# Patient Record
Sex: FEMALE | Race: WHITE | NOT HISPANIC OR LATINO | Employment: FULL TIME | RURAL
[De-identification: names, ages, dates, MRNs, and addresses within clinical notes are randomized per-mention and may not be internally consistent; named-entity substitution may affect disease eponyms.]

---

## 2023-12-05 ENCOUNTER — OFFICE VISIT (OUTPATIENT)
Dept: FAMILY MEDICINE CLINIC | Facility: CLINIC | Age: 28
End: 2023-12-05
Payer: MEDICAID

## 2023-12-05 VITALS
OXYGEN SATURATION: 99 % | HEART RATE: 80 BPM | HEIGHT: 67 IN | BODY MASS INDEX: 21.5 KG/M2 | SYSTOLIC BLOOD PRESSURE: 118 MMHG | DIASTOLIC BLOOD PRESSURE: 80 MMHG | WEIGHT: 137 LBS

## 2023-12-05 DIAGNOSIS — Z13.220 LIPID SCREENING: ICD-10-CM

## 2023-12-05 DIAGNOSIS — R07.9 CHEST PAIN, UNSPECIFIED TYPE: ICD-10-CM

## 2023-12-05 DIAGNOSIS — Z00.00 ROUTINE PHYSICAL EXAMINATION: Primary | ICD-10-CM

## 2023-12-05 DIAGNOSIS — Z13.1 DIABETES MELLITUS SCREENING: ICD-10-CM

## 2023-12-05 DIAGNOSIS — Z11.59 ENCOUNTER FOR HEPATITIS C SCREENING TEST FOR LOW RISK PATIENT: ICD-10-CM

## 2023-12-05 DIAGNOSIS — K59.00 CONSTIPATION, UNSPECIFIED CONSTIPATION TYPE: ICD-10-CM

## 2023-12-05 DIAGNOSIS — Z13.29 THYROID DISORDER SCREENING: ICD-10-CM

## 2023-12-05 DIAGNOSIS — D50.9 IRON DEFICIENCY ANEMIA, UNSPECIFIED IRON DEFICIENCY ANEMIA TYPE: ICD-10-CM

## 2023-12-05 NOTE — PROGRESS NOTES
"Chief Complaint  Establish Care (Check up pt states has not ever had a primary care doctor )    Subjective          Yari Ruby presents to [facility]  History of Present Illness  Patient in today to establish care and for physical exam. She states has not had labs drawn since her last child was born 2 years ago. States had some iron deficiency during pregnancy so occasionally will take some otc iron if feeling tired. She is here fasting for labs. She is due to get back in with gyn and states will get scheduled. She states does have issues with chronic constipation- has not taken anything for it. Denies blood in stool. She also states intermittently will have some chest pain- a few weeks ago states had episode that lasted around 2 days and then went away. She denies shortness of breath. In past, felt like was associated with some mild anxiety but states this time lasted longer. She does not feel needs to see counselor or be on medication for anxiety at this time. States will have some occasional reflux symptoms but does not feel were going on when this recent episode occurred.       Objective   Vital Signs:   /80   Pulse 80   Ht 170.2 cm (67\")   Wt 62.1 kg (137 lb)   SpO2 99%   BMI 21.46 kg/m²     Body mass index is 21.46 kg/m².    Review of Systems   Constitutional:  Negative for fatigue and fever.   HENT:  Negative for congestion and sore throat.    Respiratory:  Negative for cough and shortness of breath.    Cardiovascular:  Positive for chest pain. Negative for palpitations.   Gastrointestinal:  Positive for constipation. Negative for abdominal pain, blood in stool, diarrhea, nausea and vomiting.   Genitourinary:  Negative for dysuria and frequency.   Neurological:  Negative for dizziness and headache.   Psychiatric/Behavioral:  Negative for depressed mood.        Past History:  Medical History: has no past medical history on file.   Surgical History: has no past " surgical history on file.   Family History: Family history is unknown by patient.   Social History: reports that she has never smoked. She has never used smokeless tobacco. She reports that she does not drink alcohol and does not use drugs.    No current outpatient medications on file.  Allergies: Patient has no known allergies.    Physical Exam  Constitutional:       Appearance: Normal appearance.   HENT:      Right Ear: Tympanic membrane normal.      Left Ear: Tympanic membrane normal.      Mouth/Throat:      Pharynx: Oropharynx is clear.   Eyes:      Conjunctiva/sclera: Conjunctivae normal.      Pupils: Pupils are equal, round, and reactive to light.   Cardiovascular:      Rate and Rhythm: Normal rate and regular rhythm.      Heart sounds: Normal heart sounds.   Pulmonary:      Effort: Pulmonary effort is normal.      Breath sounds: Normal breath sounds.   Abdominal:      Palpations: Abdomen is soft.      Tenderness: There is no abdominal tenderness.   Neurological:      Mental Status: She is oriented to person, place, and time.   Psychiatric:         Mood and Affect: Mood normal.         Behavior: Behavior normal.          ECG 12 Lead    Date/Time: 12/5/2023 9:17 AM  Performed by: Mayte Polk PA-C    Authorized by: Mayte Polk PA-C  Comparison: not compared with previous ECG   Previous ECG: no previous ECG available  Rhythm: sinus rhythm  Rate: normal  BPM: 69    Clinical impression: normal ECG              Assessment and Plan   Diagnoses and all orders for this visit:    1. Routine physical examination (Primary)  -     Cancel: CBC & Differential  Encouraged healthy diet and exercise. Encouraged to get in with gyn for routine follow up . Encouraged to get in for eye and dental exams.   2. Constipation, unspecified constipation type  Encouraged good hydration and to try miralax to see if can help with constipation symptoms. If not improving to let us know and she may be interested in GI consult.    3. Chest pain, unspecified type  EKG normal today. She states has not returned. Discussed if return of symptoms or progression, would recommend to get in with cardiology for consult or to ER for any acute symptoms if needed.   4. Diabetes mellitus screening  -     Cancel: Comprehensive Metabolic Panel  -     Cancel: Hemoglobin A1c  -     Comprehensive Metabolic Panel  -     Hemoglobin A1c  Will check hga1c with labs.   5. Lipid screening  -     Cancel: Lipid Panel  -     Lipid Panel    6. Encounter for hepatitis C screening test for low risk patient  -     Cancel: Hepatitis C Antibody  -     Hepatitis C Antibody    7. Thyroid disorder screening  -     Cancel: TSH  -     TSH  Will check TSH with labs.   8. Iron deficiency anemia, unspecified iron deficiency anemia type  -     CBC & Differential  -     Ferritin  -     Iron Profile; Future  Will check iron studies and blood count.  Other orders  -     ECG 12 Lead            Follow Up   No follow-ups on file.  Patient was given instructions and counseling regarding her condition or for health maintenance advice. Please see specific information pulled into the AVS if appropriate.     Mayte Polk PA-C

## 2023-12-06 LAB
ALBUMIN SERPL-MCNC: 4.6 G/DL (ref 4–5)
ALBUMIN/GLOB SERPL: 1.8 {RATIO} (ref 1.2–2.2)
ALP SERPL-CCNC: 64 IU/L (ref 44–121)
ALT SERPL-CCNC: 6 IU/L (ref 0–32)
AST SERPL-CCNC: 14 IU/L (ref 0–40)
BASOPHILS # BLD AUTO: 0 X10E3/UL (ref 0–0.2)
BASOPHILS NFR BLD AUTO: 0 %
BILIRUB SERPL-MCNC: 0.5 MG/DL (ref 0–1.2)
BUN SERPL-MCNC: 14 MG/DL (ref 6–20)
BUN/CREAT SERPL: 20 (ref 9–23)
CALCIUM SERPL-MCNC: 9.7 MG/DL (ref 8.7–10.2)
CHLORIDE SERPL-SCNC: 104 MMOL/L (ref 96–106)
CHOLEST SERPL-MCNC: 153 MG/DL (ref 100–199)
CO2 SERPL-SCNC: 22 MMOL/L (ref 20–29)
CREAT SERPL-MCNC: 0.7 MG/DL (ref 0.57–1)
EGFRCR SERPLBLD CKD-EPI 2021: 122 ML/MIN/1.73
EOSINOPHIL # BLD AUTO: 0.2 X10E3/UL (ref 0–0.4)
EOSINOPHIL NFR BLD AUTO: 2 %
ERYTHROCYTE [DISTWIDTH] IN BLOOD BY AUTOMATED COUNT: 11.9 % (ref 11.7–15.4)
FERRITIN SERPL-MCNC: 77 NG/ML (ref 15–150)
GLOBULIN SER CALC-MCNC: 2.6 G/DL (ref 1.5–4.5)
GLUCOSE SERPL-MCNC: 77 MG/DL (ref 70–99)
HBA1C MFR BLD: 5 % (ref 4.8–5.6)
HCT VFR BLD AUTO: 37.9 % (ref 34–46.6)
HCV IGG SERPL QL IA: NON REACTIVE
HDLC SERPL-MCNC: 44 MG/DL
HGB BLD-MCNC: 12.7 G/DL (ref 11.1–15.9)
IMM GRANULOCYTES # BLD AUTO: 0.1 X10E3/UL (ref 0–0.1)
IMM GRANULOCYTES NFR BLD AUTO: 1 %
LDLC SERPL CALC-MCNC: 97 MG/DL (ref 0–99)
LYMPHOCYTES # BLD AUTO: 3.6 X10E3/UL (ref 0.7–3.1)
LYMPHOCYTES NFR BLD AUTO: 38 %
MCH RBC QN AUTO: 30 PG (ref 26.6–33)
MCHC RBC AUTO-ENTMCNC: 33.5 G/DL (ref 31.5–35.7)
MCV RBC AUTO: 90 FL (ref 79–97)
MONOCYTES # BLD AUTO: 0.5 X10E3/UL (ref 0.1–0.9)
MONOCYTES NFR BLD AUTO: 5 %
NEUTROPHILS # BLD AUTO: 5.3 X10E3/UL (ref 1.4–7)
NEUTROPHILS NFR BLD AUTO: 54 %
PLATELET # BLD AUTO: 269 X10E3/UL (ref 150–450)
POTASSIUM SERPL-SCNC: 4 MMOL/L (ref 3.5–5.2)
PROT SERPL-MCNC: 7.2 G/DL (ref 6–8.5)
RBC # BLD AUTO: 4.23 X10E6/UL (ref 3.77–5.28)
SODIUM SERPL-SCNC: 140 MMOL/L (ref 134–144)
TRIGL SERPL-MCNC: 56 MG/DL (ref 0–149)
TSH SERPL DL<=0.005 MIU/L-ACNC: 2.52 UIU/ML (ref 0.45–4.5)
VLDLC SERPL CALC-MCNC: 12 MG/DL (ref 5–40)
WBC # BLD AUTO: 9.7 X10E3/UL (ref 3.4–10.8)

## 2023-12-12 LAB
IRON SATN MFR SERPL: 22 % (ref 15–55)
IRON SERPL-MCNC: 65 UG/DL (ref 27–159)
TIBC SERPL-MCNC: 302 UG/DL (ref 250–450)
UIBC SERPL-MCNC: 237 UG/DL (ref 131–425)
WRITTEN AUTHORIZATION: NORMAL

## 2024-01-26 ENCOUNTER — TELEPHONE (OUTPATIENT)
Dept: FAMILY MEDICINE CLINIC | Facility: CLINIC | Age: 29
End: 2024-01-26
Payer: MEDICAID

## 2024-01-26 NOTE — TELEPHONE ENCOUNTER
Patient called because she has been having symptoms of a yeast infection.  She wanted to get in today but we had no openings.  She mentioned that she was using some cream she had purchased from [employer] and wanted to know if it would be ok to keep using it.  I let her know that I could not advise and that I would see if someone could call her back and advise her on the situation.

## 2024-02-06 ENCOUNTER — OFFICE VISIT (OUTPATIENT)
Dept: FAMILY MEDICINE CLINIC | Facility: CLINIC | Age: 29
End: 2024-02-06
Payer: MEDICAID

## 2024-02-06 VITALS
HEIGHT: 67 IN | OXYGEN SATURATION: 100 % | SYSTOLIC BLOOD PRESSURE: 112 MMHG | BODY MASS INDEX: 21.19 KG/M2 | DIASTOLIC BLOOD PRESSURE: 68 MMHG | HEART RATE: 76 BPM | WEIGHT: 135 LBS

## 2024-02-06 DIAGNOSIS — J02.9 SORE THROAT: Primary | ICD-10-CM

## 2024-02-06 DIAGNOSIS — J02.0 STREP THROAT: ICD-10-CM

## 2024-02-06 LAB
EXPIRATION DATE: ABNORMAL
INTERNAL CONTROL: ABNORMAL
Lab: ABNORMAL
S PYO AG THROAT QL: POSITIVE

## 2024-02-06 PROCEDURE — 1160F RVW MEDS BY RX/DR IN RCRD: CPT | Performed by: FAMILY MEDICINE

## 2024-02-06 PROCEDURE — 87880 STREP A ASSAY W/OPTIC: CPT | Performed by: FAMILY MEDICINE

## 2024-02-06 PROCEDURE — 1159F MED LIST DOCD IN RCRD: CPT | Performed by: FAMILY MEDICINE

## 2024-02-06 PROCEDURE — 99213 OFFICE O/P EST LOW 20 MIN: CPT | Performed by: FAMILY MEDICINE

## 2024-02-06 RX ORDER — FLUCONAZOLE 150 MG/1
150 TABLET ORAL ONCE
Qty: 1 TABLET | Refills: 0 | Status: SHIPPED | OUTPATIENT
Start: 2024-02-06 | End: 2024-02-06

## 2024-02-06 RX ORDER — CEPHALEXIN 500 MG/1
500 CAPSULE ORAL 3 TIMES DAILY
Qty: 30 CAPSULE | Refills: 0 | Status: SHIPPED | OUTPATIENT
Start: 2024-02-06

## 2024-02-06 NOTE — PROGRESS NOTES
"    Office Note     Name: Yari Ruby    : 1995     MRN: [id number]     Chief Complaint  Sore Throat    Subjective     History of Present Illness:  Yari Ruby is a 28 y.o. female who presents today for sore throat, x 24 hours works at a  situation.  No fever no chills no sweats.  She has a cough and a cold symptoms    Review of Systems:   Review of Systems    Past Medical History: History reviewed. No pertinent past medical history.    Past Surgical History: History reviewed. No pertinent surgical history.    Family History:   Family History   Family history unknown: Yes       Social History:   Social History     Socioeconomic History    Marital status: Single   Tobacco Use    Smoking status: Never     Passive exposure: Never    Smokeless tobacco: Never   Vaping Use    Vaping Use: Never used   Substance and Sexual Activity    Alcohol use: Never    Drug use: Never    Sexual activity: Defer       Immunizations:   Immunization History   Administered Date(s) Administered    Fluzone (or Fluarix & Flulaval for VFC) >6mos 2023    MMR 02/15/2017    Tdap 2021        Medications:     Current Outpatient Medications:     cephalexin (Keflex) 500 MG capsule, Take 1 capsule by mouth 3 (Three) Times a Day., Disp: 30 capsule, Rfl: 0    fluconazole (Diflucan) 150 MG tablet, Take 1 tablet by mouth 1 (One) Time for 1 dose., Disp: 1 tablet, Rfl: 0    Allergies:   No Known Allergies    Objective     Vital Signs  /68   Pulse 76   Ht 170.2 cm (67\")   Wt 61.2 kg (135 lb)   SpO2 100%   BMI 21.14 kg/m²   Estimated body mass index is 21.14 kg/m² as calculated from the following:    Height as of this encounter: 170.2 cm (67\").    Weight as of this encounter: 61.2 kg (135 lb).    BMI is within normal parameters. No other follow-up for BMI required.      Physical Exam  Vitals and nursing note reviewed.   Constitutional:       Appearance: Normal appearance. She is normal weight.   HENT:      Head: " Normocephalic and atraumatic.      Right Ear: Tympanic membrane, ear canal and external ear normal.      Left Ear: There is impacted cerumen.      Nose: No congestion or rhinorrhea.      Mouth/Throat:      Mouth: Mucous membranes are moist. Mucous membranes are dry.   Eyes:      Extraocular Movements: Extraocular movements intact.      Conjunctiva/sclera: Conjunctivae normal.      Pupils: Pupils are equal, round, and reactive to light.   Pulmonary:      Effort: Pulmonary effort is normal.      Breath sounds: Normal breath sounds.   Lymphadenopathy:      Cervical: No cervical adenopathy.   Skin:     General: Skin is warm and dry.   Neurological:      General: No focal deficit present.      Mental Status: She is alert.          Procedures     Assessment and Plan     1. Sore throat    - POC Rapid Strep A    2. Strep throat  Keflex x 10 days, Diflucan if needed.  All of 2 days to work       Follow Up  Return if symptoms worsen or fail to improve.    [facility]in[facility]  [address] [place] [id number] [phone]

## 2024-02-06 NOTE — Clinical Note
February 6, 2024     Patient: Yari Ruby   YOB: 1995   Date of Visit: 2/6/2024       To Whom it May Concern:    Yari Ruby was seen in my clinic on 2/6/2024. She may return to school in two days.         Sincerely,          Vincent Manrique MD        CC: No Recipients

## 2024-03-11 ENCOUNTER — OFFICE VISIT (OUTPATIENT)
Dept: FAMILY MEDICINE CLINIC | Facility: CLINIC | Age: 29
End: 2024-03-11
Payer: MEDICAID

## 2024-03-11 VITALS
DIASTOLIC BLOOD PRESSURE: 72 MMHG | HEART RATE: 91 BPM | BODY MASS INDEX: 21.19 KG/M2 | SYSTOLIC BLOOD PRESSURE: 106 MMHG | HEIGHT: 67 IN | OXYGEN SATURATION: 99 % | WEIGHT: 135 LBS

## 2024-03-11 DIAGNOSIS — J02.9 SORE THROAT: Primary | ICD-10-CM

## 2024-03-11 DIAGNOSIS — R10.12 LEFT UPPER QUADRANT ABDOMINAL PAIN: ICD-10-CM

## 2024-03-11 LAB
EXPIRATION DATE: NORMAL
EXPIRATION DATE: NORMAL
HETEROPH AB SER QL LA: NEGATIVE
INTERNAL CONTROL: NORMAL
INTERNAL CONTROL: NORMAL
Lab: NORMAL
Lab: NORMAL
S PYO AG THROAT QL: NEGATIVE

## 2024-03-11 PROCEDURE — 86308 HETEROPHILE ANTIBODY SCREEN: CPT | Performed by: NURSE PRACTITIONER

## 2024-03-11 PROCEDURE — 99214 OFFICE O/P EST MOD 30 MIN: CPT | Performed by: NURSE PRACTITIONER

## 2024-03-11 PROCEDURE — 1159F MED LIST DOCD IN RCRD: CPT | Performed by: NURSE PRACTITIONER

## 2024-03-11 PROCEDURE — 87880 STREP A ASSAY W/OPTIC: CPT | Performed by: NURSE PRACTITIONER

## 2024-03-11 PROCEDURE — 1160F RVW MEDS BY RX/DR IN RCRD: CPT | Performed by: NURSE PRACTITIONER

## 2024-03-11 RX ORDER — CEFDINIR 300 MG/1
300 CAPSULE ORAL 2 TIMES DAILY
Qty: 20 CAPSULE | Refills: 0 | Status: SHIPPED | OUTPATIENT
Start: 2024-03-11

## 2024-03-11 NOTE — PROGRESS NOTES
"Chief Complaint  Abdominal Pain (Lt side pain and neck pain started on Thurs), Neck Pain, Urticaria (Hives noticed last night around bedtime), and Sore Throat (Woke up this am with sore throat with worsening hives. )    Subjective          Yari Ruby presents to [facility]  History of Present Illness  Pt has had sore throat, tender lymph nodes in her neck, and rash since yesterday. She has had increased fatigue in the past few days. She has had LUQ abdominal tenderness since yesterday.   Abdominal Pain    Neck Pain     Urticaria  Associated symptoms include a sore throat.   Sore Throat   Associated symptoms include abdominal pain and neck pain.       Objective   Vital Signs:   /72   Pulse 91   Ht 170.2 cm (67\")   Wt 61.2 kg (135 lb)   SpO2 99%   BMI 21.14 kg/m²     Body mass index is 21.14 kg/m².    Review of Systems   HENT:  Positive for sore throat.    Gastrointestinal:  Positive for abdominal pain.   Musculoskeletal:  Positive for neck pain.   Skin:  Positive for rash.          Current Outpatient Medications:     cefdinir (OMNICEF) 300 MG capsule, Take 1 capsule by mouth 2 (Two) Times a Day., Disp: 20 capsule, Rfl: 0      Allergies: Patient has no known allergies.    Physical Exam  Constitutional:       Appearance: Normal appearance.   HENT:      Head: Normocephalic.      Mouth/Throat:      Pharynx: Oropharyngeal exudate and posterior oropharyngeal erythema present.   Eyes:      Conjunctiva/sclera: Conjunctivae normal.      Pupils: Pupils are equal, round, and reactive to light.   Cardiovascular:      Rate and Rhythm: Normal rate and regular rhythm.      Heart sounds: Normal heart sounds.   Pulmonary:      Effort: Pulmonary effort is normal.      Breath sounds: Normal breath sounds.   Abdominal:      Tenderness: There is no abdominal tenderness.   Musculoskeletal:         General: Normal range of motion.   Lymphadenopathy:      Cervical:      Left cervical: Posterior " cervical adenopathy present.   Skin:     General: Skin is warm and dry.      Capillary Refill: Capillary refill takes less than 2 seconds.   Neurological:      General: No focal deficit present.      Mental Status: She is alert and oriented to person, place, and time.   Psychiatric:         Mood and Affect: Mood normal.         Behavior: Behavior normal.         Thought Content: Thought content normal.         Judgment: Judgment normal.          Result Review :                   Assessment and Plan    Diagnoses and all orders for this visit:    1. Sore throat (Primary)  Comments:  Warm salt water gargles and increase fluids. Tylenol/Motrin PRN. Cover for strep.  Orders:  -     POCT rapid strep A  -     POC Infectious Mononucleosis Antibody  -     Throat / Upper Respiratory Culture - Swab, Throat  -     cefdinir (OMNICEF) 300 MG capsule; Take 1 capsule by mouth 2 (Two) Times a Day.  Dispense: 20 capsule; Refill: 0    2. Left upper quadrant abdominal pain  Comments:  Go to ER for worsened sx. RTC if not improving in 1 week. Treat for constipation with Miralax.                Follow Up   Return in about 1 week (around 3/18/2024) for if not improving or sooner if symptoms worsen.  Patient was given instructions and counseling regarding her condition or for health maintenance advice. Please see specific information pulled into the AVS if appropriate.     ALON Borges

## 2024-03-13 LAB
BACTERIA SPEC RESP CULT: NORMAL
BACTERIA SPEC RESP CULT: NORMAL

## 2024-03-26 ENCOUNTER — OFFICE VISIT (OUTPATIENT)
Dept: FAMILY MEDICINE CLINIC | Facility: CLINIC | Age: 29
End: 2024-03-26
Payer: MEDICAID

## 2024-03-26 VITALS
SYSTOLIC BLOOD PRESSURE: 100 MMHG | DIASTOLIC BLOOD PRESSURE: 70 MMHG | HEART RATE: 76 BPM | HEIGHT: 67 IN | BODY MASS INDEX: 21.35 KG/M2 | OXYGEN SATURATION: 98 % | WEIGHT: 136 LBS

## 2024-03-26 DIAGNOSIS — F41.9 ANXIETY: ICD-10-CM

## 2024-03-26 DIAGNOSIS — J02.9 SORE THROAT: Primary | ICD-10-CM

## 2024-03-26 DIAGNOSIS — R10.11 RUQ PAIN: ICD-10-CM

## 2024-03-26 PROCEDURE — 99214 OFFICE O/P EST MOD 30 MIN: CPT | Performed by: PHYSICIAN ASSISTANT

## 2024-03-27 LAB
ALBUMIN SERPL-MCNC: 4.6 G/DL (ref 4–5)
ALBUMIN/GLOB SERPL: 1.8 {RATIO} (ref 1.2–2.2)
ALP SERPL-CCNC: 63 IU/L (ref 44–121)
ALT SERPL-CCNC: 7 IU/L (ref 0–32)
AMYLASE SERPL-CCNC: 44 U/L (ref 31–110)
AST SERPL-CCNC: 18 IU/L (ref 0–40)
BASOPHILS # BLD AUTO: 0 X10E3/UL (ref 0–0.2)
BASOPHILS NFR BLD AUTO: 0 %
BILIRUB SERPL-MCNC: 0.6 MG/DL (ref 0–1.2)
BUN SERPL-MCNC: 13 MG/DL (ref 6–20)
BUN/CREAT SERPL: 19 (ref 9–23)
CALCIUM SERPL-MCNC: 9.3 MG/DL (ref 8.7–10.2)
CHLORIDE SERPL-SCNC: 102 MMOL/L (ref 96–106)
CMV IGG SERPL IA-ACNC: 5.7 U/ML (ref 0–0.59)
CMV IGM SERPL IA-ACNC: <30 AU/ML (ref 0–29.9)
CO2 SERPL-SCNC: 23 MMOL/L (ref 20–29)
CREAT SERPL-MCNC: 0.68 MG/DL (ref 0.57–1)
EBV VCA IGG SER IA-ACNC: 184 U/ML (ref 0–17.9)
EBV VCA IGM SER IA-ACNC: <36 U/ML (ref 0–35.9)
EGFRCR SERPLBLD CKD-EPI 2021: 123 ML/MIN/1.73
EOSINOPHIL # BLD AUTO: 0.2 X10E3/UL (ref 0–0.4)
EOSINOPHIL NFR BLD AUTO: 2 %
ERYTHROCYTE [DISTWIDTH] IN BLOOD BY AUTOMATED COUNT: 12 % (ref 11.7–15.4)
GLOBULIN SER CALC-MCNC: 2.6 G/DL (ref 1.5–4.5)
GLUCOSE SERPL-MCNC: 77 MG/DL (ref 70–99)
HCT VFR BLD AUTO: 35.5 % (ref 34–46.6)
HGB BLD-MCNC: 12.3 G/DL (ref 11.1–15.9)
IMM GRANULOCYTES # BLD AUTO: 0 X10E3/UL (ref 0–0.1)
IMM GRANULOCYTES NFR BLD AUTO: 0 %
LIPASE SERPL-CCNC: 28 U/L (ref 14–72)
LYMPHOCYTES # BLD AUTO: 3.8 X10E3/UL (ref 0.7–3.1)
LYMPHOCYTES NFR BLD AUTO: 44 %
MCH RBC QN AUTO: 30.2 PG (ref 26.6–33)
MCHC RBC AUTO-ENTMCNC: 34.6 G/DL (ref 31.5–35.7)
MCV RBC AUTO: 87 FL (ref 79–97)
MONOCYTES # BLD AUTO: 0.5 X10E3/UL (ref 0.1–0.9)
MONOCYTES NFR BLD AUTO: 6 %
NEUTROPHILS # BLD AUTO: 4.2 X10E3/UL (ref 1.4–7)
NEUTROPHILS NFR BLD AUTO: 48 %
PLATELET # BLD AUTO: 254 X10E3/UL (ref 150–450)
POTASSIUM SERPL-SCNC: 4.1 MMOL/L (ref 3.5–5.2)
PROT SERPL-MCNC: 7.2 G/DL (ref 6–8.5)
RBC # BLD AUTO: 4.07 X10E6/UL (ref 3.77–5.28)
SODIUM SERPL-SCNC: 140 MMOL/L (ref 134–144)
WBC # BLD AUTO: 8.7 X10E3/UL (ref 3.4–10.8)

## 2024-03-27 NOTE — PROGRESS NOTES
"Chief Complaint  Anxiety (Discuss getting on medication for anxiety )    Subjective          Yari Ruby presents to [facility]  History of Present Illness  Patient in today to discuss starting on medication for anxiety. She states has has these symptoms for >1 year and has just dealt with it but feels is getting to be more often. States feels may have some mild depression as well. Denies SI/HI.  She also states was in a few weeks ago for sore throat and had negative throat culture and negative monospot test, took antibiotic and states throat is better but occasionally will have some soreness. She also states has had some intermittent abdominal discomfort, noticed more after eating and wondered if could be gallbladder related. Denies vomiting or diarrhea. Denies blood in stool.   Anxiety  Presents for initial visit. Symptoms include depressed mood and nervous/anxious behavior. Patient reports no chest pain, dizziness, irritability, nausea, palpitations, shortness of breath or suicidal ideas.       Abdominal Pain  This is a recurrent problem. The pain is located in the RUQ. The pain is mild. Associated symptoms include melena. Pertinent negatives include no diarrhea, dysuria, fever, frequency, nausea or vomiting.       Objective   Vital Signs:   /70   Pulse 76   Ht 170.2 cm (67\")   Wt 61.7 kg (136 lb)   SpO2 98%   BMI 21.30 kg/m²     Body mass index is 21.3 kg/m².    Review of Systems   Constitutional:  Negative for fever and irritability.   Respiratory:  Negative for shortness of breath.    Cardiovascular:  Negative for chest pain and palpitations.   Gastrointestinal:  Positive for abdominal pain and melena. Negative for diarrhea, nausea and vomiting.   Genitourinary:  Negative for dysuria and frequency.   Neurological:  Negative for dizziness and headache.   Psychiatric/Behavioral:  Positive for depressed mood. Negative for suicidal ideas. The patient is nervous/anxious.  "       Past History:  Medical History: has no past medical history on file.   Surgical History: has no past surgical history on file.   Family History: Family history is unknown by patient.   Social History: reports that she has never smoked. She has never been exposed to tobacco smoke. She has never used smokeless tobacco. She reports that she does not drink alcohol and does not use drugs.      Current Outpatient Medications:     sertraline (Zoloft) 50 MG tablet, Take 1/2 tablet by oral route QD x 1 week then 1 tablet by oral route QD, Disp: 30 tablet, Rfl: 0  Allergies: Patient has no known allergies.    Physical Exam  Constitutional:       Appearance: Normal appearance.   HENT:      Right Ear: Tympanic membrane normal.      Left Ear: Tympanic membrane normal.      Mouth/Throat:      Pharynx: Oropharynx is clear.   Eyes:      Conjunctiva/sclera: Conjunctivae normal.      Pupils: Pupils are equal, round, and reactive to light.   Cardiovascular:      Rate and Rhythm: Normal rate and regular rhythm.      Heart sounds: Normal heart sounds.   Pulmonary:      Effort: Pulmonary effort is normal.      Breath sounds: Normal breath sounds.   Abdominal:      Palpations: Abdomen is soft.      Tenderness: There is no abdominal tenderness in the right upper quadrant. There is no guarding or rebound.      Comments: Minimal tenderness to palpation of RUQ on exam.    Neurological:      Mental Status: She is oriented to person, place, and time.   Psychiatric:         Mood and Affect: Mood normal.         Behavior: Behavior normal.             Assessment and Plan   Diagnoses and all orders for this visit:    1. Sore throat (Primary)  -     EBV Antibody VCA, IgG  -     Price-Barr Virus VCA, IgM  -     Cytomegalovirus Antibody, IgG  -     Cytomegalovirus Antibody, IgM  Will check EBV and CMV labs today. Recommend symptom management at this time. If symptoms persist, can get in with ENT if needed.   2. RUQ pain  -     CBC &  Differential  -     Comprehensive Metabolic Panel  -     Amylase  -     Lipase  Will check a few labs today. Discussed if symptoms persist can get in for gallbladder ultrasound- patient to let us know. To ER for any acute/worsening symptoms if needed.   3. Anxiety  She would like to start on medication. Will start on sertraline. Discussed possible side effects. Offered to get in for counseling as well- she prefers to try medication first. Recheck in 3-4 weeks, prior as needed.   Other orders  -     sertraline (Zoloft) 50 MG tablet; Take 1/2 tablet by oral route QD x 1 week then 1 tablet by oral route QD  Dispense: 30 tablet; Refill: 0            Follow Up   No follow-ups on file.  Patient was given instructions and counseling regarding her condition or for health maintenance advice. Please see specific information pulled into the AVS if appropriate.     Mayte Polk PA-C

## 2024-03-29 ENCOUNTER — TELEPHONE (OUTPATIENT)
Dept: FAMILY MEDICINE CLINIC | Facility: CLINIC | Age: 29
End: 2024-03-29
Payer: MEDICAID

## 2024-03-29 DIAGNOSIS — R10.11 RUQ PAIN: Primary | ICD-10-CM

## 2024-03-29 NOTE — TELEPHONE ENCOUNTER
Caller: Yari Ruby    Relationship: Self    Best call back number:[phone]         Who are you requesting to speak with (clinical staff, provider,  specific staff member): CLINICAL        What was the call regarding: PATIENT REQUESTS YOU CALL HER WITH LAB RESULTS.

## 2024-04-12 ENCOUNTER — HOSPITAL ENCOUNTER (OUTPATIENT)
Dept: ULTRASOUND IMAGING | Facility: HOSPITAL | Age: 29
Discharge: HOME | End: 2024-04-12
Payer: MEDICAID

## 2024-04-12 DIAGNOSIS — R10.11 RUQ PAIN: ICD-10-CM

## 2024-04-12 PROCEDURE — 76705 ECHO EXAM OF ABDOMEN: CPT

## 2024-04-25 ENCOUNTER — OFFICE VISIT (OUTPATIENT)
Dept: FAMILY MEDICINE CLINIC | Facility: CLINIC | Age: 29
End: 2024-04-25
Payer: MEDICAID

## 2024-04-25 VITALS
SYSTOLIC BLOOD PRESSURE: 100 MMHG | HEART RATE: 74 BPM | WEIGHT: 132 LBS | BODY MASS INDEX: 20.72 KG/M2 | HEIGHT: 67 IN | OXYGEN SATURATION: 100 % | DIASTOLIC BLOOD PRESSURE: 70 MMHG

## 2024-04-25 DIAGNOSIS — F41.1 GENERALIZED ANXIETY DISORDER: ICD-10-CM

## 2024-04-25 DIAGNOSIS — G47.00 INSOMNIA, UNSPECIFIED TYPE: Primary | ICD-10-CM

## 2024-04-25 PROCEDURE — 1160F RVW MEDS BY RX/DR IN RCRD: CPT | Performed by: PHYSICIAN ASSISTANT

## 2024-04-25 PROCEDURE — 99214 OFFICE O/P EST MOD 30 MIN: CPT | Performed by: PHYSICIAN ASSISTANT

## 2024-04-25 PROCEDURE — 1159F MED LIST DOCD IN RCRD: CPT | Performed by: PHYSICIAN ASSISTANT

## 2024-04-25 NOTE — PROGRESS NOTES
"Chief Complaint  Anxiety (Med check )    Subjective          Yari Ruby presents to [facility]  History of Present Illness  Patient in today for medication recheck and refill. States the sertraline has helped with her anxiety symptoms and is pleased with the current dosage and would like to continue at this time. Denies side effects of medication. She states has had ongoing issues with sleep for >1 year-- falls asleep fine but wakes up often. Denies snoring. She is interested in seeing sleep specialist. Has been taking otc meds with minimal benefit.   Anxiety  Presents for follow-up visit.  Symptoms include nervous/anxious behavior.  Patient reports no chest pain, depressed mood, dizziness, nausea, shortness of breath or suicidal ideas.       Objective   Vital Signs:   /70   Pulse 74   Ht 170.2 cm (67\")   Wt 59.9 kg (132 lb)   SpO2 100%   BMI 20.67 kg/m²     Body mass index is 20.67 kg/m².    Review of Systems   Constitutional:  Negative for fatigue.   HENT:  Negative for congestion and sore throat.    Respiratory:  Negative for cough and shortness of breath.    Cardiovascular:  Negative for chest pain.   Gastrointestinal:  Negative for abdominal pain, diarrhea, nausea and vomiting.   Neurological:  Negative for dizziness and headache.   Psychiatric/Behavioral:  Positive for sleep disturbance. Negative for suicidal ideas and depressed mood. The patient is nervous/anxious.        Past History:  Medical History: has no past medical history on file.   Surgical History: has no past surgical history on file.   Family History: Family history is unknown by patient.   Social History: reports that she has never smoked. She has never been exposed to tobacco smoke. She has never used smokeless tobacco. She reports that she does not drink alcohol and does not use drugs.      Current Outpatient Medications:     sertraline (Zoloft) 50 MG tablet, Take one tablet by oral route once a " day, Disp: 90 tablet, Rfl: 1  Allergies: Patient has no known allergies.    Physical Exam  Constitutional:       Appearance: Normal appearance.   Eyes:      Conjunctiva/sclera: Conjunctivae normal.      Pupils: Pupils are equal, round, and reactive to light.   Cardiovascular:      Rate and Rhythm: Normal rate and regular rhythm.      Heart sounds: Normal heart sounds.   Pulmonary:      Effort: Pulmonary effort is normal.      Breath sounds: Normal breath sounds.   Neurological:      Mental Status: She is oriented to person, place, and time.   Psychiatric:         Mood and Affect: Mood normal.         Behavior: Behavior normal.             Assessment and Plan   Diagnoses and all orders for this visit:    1. Insomnia, unspecified type (Primary)  -     Ambulatory Referral to Sleep Medicine  Will put in referral for sleep specialist. Encouraged to avoid caffeine and use of TV, cell phone when trying to sleep.   2. Generalized anxiety disorder  Refilled sertraline at current dosage; rtc prior to recheck as needed.   Other orders  -     sertraline (Zoloft) 50 MG tablet; Take one tablet by oral route once a day  Dispense: 90 tablet; Refill: 1            Follow Up   Return in about 6 months (around 10/25/2024).  Patient was given instructions and counseling regarding her condition or for health maintenance advice. Please see specific information pulled into the AVS if appropriate.     Mayte Polk PA-C

## 2024-10-24 ENCOUNTER — OFFICE VISIT (OUTPATIENT)
Dept: FAMILY MEDICINE CLINIC | Facility: CLINIC | Age: 29
End: 2024-10-24
Payer: MEDICAID

## 2024-10-24 VITALS
SYSTOLIC BLOOD PRESSURE: 120 MMHG | HEART RATE: 71 BPM | WEIGHT: 142 LBS | OXYGEN SATURATION: 100 % | BODY MASS INDEX: 22.29 KG/M2 | DIASTOLIC BLOOD PRESSURE: 80 MMHG | HEIGHT: 67 IN

## 2024-10-24 DIAGNOSIS — F33.0 MAJOR DEPRESSIVE DISORDER, RECURRENT, MILD: ICD-10-CM

## 2024-10-24 DIAGNOSIS — F41.1 GENERALIZED ANXIETY DISORDER: ICD-10-CM

## 2024-10-24 DIAGNOSIS — H61.22 IMPACTED CERUMEN OF LEFT EAR: ICD-10-CM

## 2024-10-24 DIAGNOSIS — J01.00 ACUTE NON-RECURRENT MAXILLARY SINUSITIS: Primary | ICD-10-CM

## 2024-10-24 PROCEDURE — 99214 OFFICE O/P EST MOD 30 MIN: CPT | Performed by: PHYSICIAN ASSISTANT

## 2024-10-24 RX ORDER — AMOXICILLIN 500 MG/1
CAPSULE ORAL
Qty: 30 CAPSULE | Refills: 0 | Status: SHIPPED | OUTPATIENT
Start: 2024-10-24

## 2024-10-24 RX ORDER — SERTRALINE HYDROCHLORIDE 100 MG/1
TABLET, FILM COATED ORAL
Qty: 90 TABLET | Refills: 0 | Status: SHIPPED | OUTPATIENT
Start: 2024-10-24

## 2024-10-24 NOTE — PROGRESS NOTES
"Chief Complaint  Anxiety and Earache (Left earache going on for a couple of weeks )    Subjective          Yari Ruby presents to [facility]  History of Present Illness  Patient in  today for recheck on her anxiety.  She feels her anxiety has progressed over the last several months and the sertraline is not working as well as it did in the beginning.  She also feels like she has some mild depression symptoms.  Denies any SI/HI.  While here, she also states has been bothered intermittently by some left ear discomfort.  Denies hearing loss.  States has had some mild nasal congestion ; denies sore throat.  Denies any fever.  Has had intermittent mild headaches as well.   is up-to-date on her eye exam.  Earache   There is pain in the left ear. This is a new problem. The current episode started 1 to 4 weeks ago. The problem occurs weekly. The problem has been coming and going. There has been no fever. The pain is at a severity of 6/10. Associated symptoms include headaches and neck pain. Pertinent negatives include no abdominal pain, coughing, diarrhea, drainage, hearing loss, rash, rhinorrhea, sore throat or vomiting. She has tried analgesic ear drops for the symptoms. The treatment provided mild relief.   Anxiety  Presents for follow-up visit.  Symptoms include depressed mood and nervous/anxious behavior.  Patient reports no chest pain, dizziness, nausea, shortness of breath or suicidal ideas.       Objective   Vital Signs:   /80   Pulse 71   Ht 170.2 cm (67\")   Wt 64.4 kg (142 lb)   SpO2 100%   BMI 22.24 kg/m²     Body mass index is 22.24 kg/m².    Review of Systems   Constitutional:  Negative for fever.   HENT:  Positive for ear pain. Negative for congestion, hearing loss, rhinorrhea, sore throat and tinnitus.    Respiratory:  Negative for cough and shortness of breath.    Cardiovascular:  Negative for chest pain.   Gastrointestinal:  Negative for abdominal pain, " diarrhea, nausea and vomiting.   Musculoskeletal:  Positive for neck pain.   Skin:  Negative for rash.   Neurological:  Positive for headache. Negative for dizziness.   Hematological:  Negative for adenopathy.   Psychiatric/Behavioral:  Positive for depressed mood. Negative for suicidal ideas. The patient is nervous/anxious.        Past History:  Medical History: has no past medical history on file.   Surgical History: has no past surgical history on file.   Family History: Family history is unknown by patient.   Social History: reports that she has never smoked. She has never been exposed to tobacco smoke. She has never used smokeless tobacco. She reports that she does not currently use alcohol. She reports that she does not use drugs.      Current Outpatient Medications:     amoxicillin (AMOXIL) 500 MG capsule, Take one tablet by oral route TID x 10 days, Disp: 30 capsule, Rfl: 0    sertraline (Zoloft) 100 MG tablet, Take one tablet by oral route once a day, Disp: 90 tablet, Rfl: 0  Allergies: Patient has no known allergies.    Physical Exam  Constitutional:       Appearance: Normal appearance.   HENT:      Right Ear: Tympanic membrane normal.      Left Ear: Tympanic membrane normal. There is impacted cerumen.      Mouth/Throat:      Pharynx: Oropharynx is clear.   Eyes:      Conjunctiva/sclera: Conjunctivae normal.      Pupils: Pupils are equal, round, and reactive to light.   Cardiovascular:      Rate and Rhythm: Normal rate and regular rhythm.      Heart sounds: Normal heart sounds.   Pulmonary:      Effort: Pulmonary effort is normal.      Breath sounds: Normal breath sounds.   Abdominal:      Palpations: Abdomen is soft.      Tenderness: There is no abdominal tenderness.   Neurological:      Mental Status: She is oriented to person, place, and time.   Psychiatric:         Mood and Affect: Mood normal.         Behavior: Behavior normal.        Ear Cerumen Removal    Date/Time: 10/25/2024 7:43 PM    Performed  by: Mayte Polk PA-C  Authorized by: Mayte Polk PA-C  Location details: left ear  Patient tolerance: patient tolerated the procedure well with no immediate complications  Procedure type: irrigation   Sedation:  Patient sedated: no              Assessment and Plan   Diagnoses and all orders for this visit:    1. Acute non-recurrent maxillary sinusitis (Primary)  Will start on antibiotic; encouraged good hydration; rtc if not improving   2. Impacted cerumen of left ear  Cerumen removed. Canal looks good. RTC for any return of symptoms if needed.   3. Generalized anxiety disorder  Will increase dosage of sertraline to 100 mg once  a day, discussed possible side effects; recheck in one month, prior as needed- also have given contact information for counseling and she states may consider  4. Major depressive disorder, recurrent, mild    Other orders  -     sertraline (Zoloft) 100 MG tablet; Take one tablet by oral route once a day  Dispense: 90 tablet; Refill: 0  -     amoxicillin (AMOXIL) 500 MG capsule; Take one tablet by oral route TID x 10 days  Dispense: 30 capsule; Refill: 0            Follow Up   No follow-ups on file.  Patient was given instructions and counseling regarding her condition or for health maintenance advice. Please see specific information pulled into the AVS if appropriate.     Mayte Polk PA-C

## 2024-10-25 PROCEDURE — 69209 REMOVE IMPACTED EAR WAX UNI: CPT | Performed by: PHYSICIAN ASSISTANT

## 2024-12-20 ENCOUNTER — OFFICE VISIT (OUTPATIENT)
Dept: FAMILY MEDICINE CLINIC | Facility: CLINIC | Age: 29
End: 2024-12-20
Payer: MEDICAID

## 2024-12-20 VITALS
WEIGHT: 145 LBS | BODY MASS INDEX: 22.76 KG/M2 | HEIGHT: 67 IN | OXYGEN SATURATION: 99 % | TEMPERATURE: 98.4 F | SYSTOLIC BLOOD PRESSURE: 104 MMHG | DIASTOLIC BLOOD PRESSURE: 72 MMHG | HEART RATE: 80 BPM

## 2024-12-20 DIAGNOSIS — F41.1 GENERALIZED ANXIETY DISORDER: ICD-10-CM

## 2024-12-20 DIAGNOSIS — R53.83 FATIGUE, UNSPECIFIED TYPE: ICD-10-CM

## 2024-12-20 DIAGNOSIS — R51.9 CHRONIC NONINTRACTABLE HEADACHE, UNSPECIFIED HEADACHE TYPE: ICD-10-CM

## 2024-12-20 DIAGNOSIS — Z13.1 DIABETES MELLITUS SCREENING: ICD-10-CM

## 2024-12-20 DIAGNOSIS — G89.29 CHRONIC NONINTRACTABLE HEADACHE, UNSPECIFIED HEADACHE TYPE: ICD-10-CM

## 2024-12-20 DIAGNOSIS — R10.84 GENERALIZED ABDOMINAL PAIN: Primary | ICD-10-CM

## 2024-12-20 LAB
BILIRUB BLD-MCNC: NEGATIVE MG/DL
CLARITY, POC: CLEAR
COLOR UR: YELLOW
GLUCOSE UR STRIP-MCNC: NEGATIVE MG/DL
KETONES UR QL: NEGATIVE
LEUKOCYTE EST, POC: ABNORMAL
NITRITE UR-MCNC: NEGATIVE MG/ML
PH UR: 6.5 [PH] (ref 5–8)
PROT UR STRIP-MCNC: NEGATIVE MG/DL
RBC # UR STRIP: NEGATIVE /UL
SP GR UR: 1.02 (ref 1–1.03)
UROBILINOGEN UR QL: NORMAL

## 2024-12-20 NOTE — PROGRESS NOTES
"Chief Complaint  Abdominal Pain (Abdominal pain, headaches going on for 2 weeks )    Subjective          Yari Ruby presents to [facility]  History of Present Illness  Patient in today for evaluation on some abdominal discomfort and headaches going on for the last several weeks.  She did abruptly discontinue her sertraline 100 mg around 3 weeks ago as felt it was causing some flat lining of emotions.   She was not sure if this was related to her current symptoms or not.  She denies any vomiting.  She states she does have some nausea in the morning.Denies reflux.   Denies fever.  Denies dysuria.  States her last menstrual cycle was around 2 weeks ago.  She does have the Mirena.  She states typically has constipation issues but lately that has improved and having bowel mvt every day. She does have issues with chronic headaches that were there prior to the sertraline and would like to get in with neurology for further evaluation.  Abdominal Pain  This is a recurrent problem. The current episode started 1 to 4 weeks ago. The pain is located in the generalized abdominal region. Associated symptoms include myalgias. Pertinent negatives include no arthralgias, constipation, diarrhea, dysuria, fever, frequency, hematuria, nausea, vomiting or weight loss.       Objective   Vital Signs:   /72   Pulse 80   Temp 98.4 °F (36.9 °C)   Ht 170.2 cm (67\")   Wt 65.8 kg (145 lb)   SpO2 99%   BMI 22.71 kg/m²     Body mass index is 22.71 kg/m².    Review of Systems   Constitutional:  Negative for fever and unexpected weight loss.   HENT:  Negative for congestion and sore throat.    Respiratory:  Negative for cough and shortness of breath.    Cardiovascular:  Negative for chest pain.   Gastrointestinal:  Positive for abdominal pain. Negative for blood in stool, constipation, diarrhea, nausea, vomiting and GERD.   Genitourinary:  Negative for dysuria, frequency and hematuria.   Musculoskeletal: "  Positive for myalgias. Negative for arthralgias.   Neurological:  Positive for headache. Negative for dizziness.       Past History:  Medical History: has a past medical history of Anxiety (2021), Depression (2021), and Headache.   Surgical History: has no past surgical history on file.   Family History: Family history is unknown by patient.   Social History: reports that she has never smoked. She has never been exposed to tobacco smoke. She has never used smokeless tobacco. She reports current alcohol use of about 2.0 standard drinks of alcohol per week. She reports that she does not use drugs.    No current outpatient medications on file.  Allergies: Patient has no known allergies.    Physical Exam  Constitutional:       Appearance: Normal appearance.   HENT:      Right Ear: Tympanic membrane normal.      Left Ear: Tympanic membrane normal.      Mouth/Throat:      Pharynx: Oropharynx is clear.   Eyes:      Conjunctiva/sclera: Conjunctivae normal.      Pupils: Pupils are equal, round, and reactive to light.   Cardiovascular:      Rate and Rhythm: Normal rate and regular rhythm.      Heart sounds: Normal heart sounds.   Pulmonary:      Effort: Pulmonary effort is normal.      Breath sounds: Normal breath sounds.   Abdominal:      Palpations: Abdomen is soft.      Tenderness: There is no abdominal tenderness. There is no right CVA tenderness or left CVA tenderness.   Neurological:      Mental Status: She is oriented to person, place, and time.   Psychiatric:         Mood and Affect: Mood normal.         Behavior: Behavior normal.             Assessment and Plan   Diagnoses and all orders for this visit:    1. Generalized abdominal pain (Primary)  -     CBC & Differential  -     Comprehensive Metabolic Panel  -     Amylase  -     Lipase  -     Urine Culture - Urine, Urine, Clean Catch  -     POC Urinalysis Dipstick  Will check labs today along with urine sample.  Discussed with patient that abrupt stopping of  sertraline can often times cause adverse effects that should improve with time. Discussed if any acute worsening of symptoms would recommend ER evaluation-patient voices understanding.   2. Fatigue, unspecified type  -     TSH  -     Vitamin B12    3. Diabetes mellitus screening  -     Hemoglobin A1c  Will check hga1c with labs.   4. Chronic nonintractable headache, unspecified headache type  -     Ambulatory Referral to Neurology  She states has chronic headaches and would like to get in with neurology for further evaluation- to ER for any acute worsening of symptoms if needed   5. Generalized anxiety disorder  She would like to get all the labs back and monitor symptoms prior to trying other medication for anxiety          Follow Up   No follow-ups on file.  Patient was given instructions and counseling regarding her condition or for health maintenance advice. Please see specific information pulled into the AVS if appropriate.     Mayte Polk PA-C

## 2024-12-21 LAB
ALBUMIN SERPL-MCNC: 4.6 G/DL (ref 4–5)
ALP SERPL-CCNC: 79 IU/L (ref 44–121)
ALT SERPL-CCNC: 10 IU/L (ref 0–32)
AMYLASE SERPL-CCNC: 59 U/L (ref 31–110)
AST SERPL-CCNC: 17 IU/L (ref 0–40)
BASOPHILS # BLD AUTO: 0 X10E3/UL (ref 0–0.2)
BASOPHILS NFR BLD AUTO: 0 %
BILIRUB SERPL-MCNC: 0.3 MG/DL (ref 0–1.2)
BUN SERPL-MCNC: 11 MG/DL (ref 6–20)
BUN/CREAT SERPL: 18 (ref 9–23)
CALCIUM SERPL-MCNC: 9.5 MG/DL (ref 8.7–10.2)
CHLORIDE SERPL-SCNC: 102 MMOL/L (ref 96–106)
CO2 SERPL-SCNC: 23 MMOL/L (ref 20–29)
CREAT SERPL-MCNC: 0.62 MG/DL (ref 0.57–1)
EGFRCR SERPLBLD CKD-EPI 2021: 125 ML/MIN/1.73
EOSINOPHIL # BLD AUTO: 0.2 X10E3/UL (ref 0–0.4)
EOSINOPHIL NFR BLD AUTO: 2 %
ERYTHROCYTE [DISTWIDTH] IN BLOOD BY AUTOMATED COUNT: 11.9 % (ref 11.7–15.4)
GLOBULIN SER CALC-MCNC: 2.6 G/DL (ref 1.5–4.5)
GLUCOSE SERPL-MCNC: 76 MG/DL (ref 70–99)
HBA1C MFR BLD: 5 % (ref 4.8–5.6)
HCT VFR BLD AUTO: 40.5 % (ref 34–46.6)
HGB BLD-MCNC: 13.4 G/DL (ref 11.1–15.9)
IMM GRANULOCYTES # BLD AUTO: 0 X10E3/UL (ref 0–0.1)
IMM GRANULOCYTES NFR BLD AUTO: 0 %
LIPASE SERPL-CCNC: 35 U/L (ref 14–72)
LYMPHOCYTES # BLD AUTO: 4.3 X10E3/UL (ref 0.7–3.1)
LYMPHOCYTES NFR BLD AUTO: 42 %
MCH RBC QN AUTO: 29.5 PG (ref 26.6–33)
MCHC RBC AUTO-ENTMCNC: 33.1 G/DL (ref 31.5–35.7)
MCV RBC AUTO: 89 FL (ref 79–97)
MONOCYTES # BLD AUTO: 0.7 X10E3/UL (ref 0.1–0.9)
MONOCYTES NFR BLD AUTO: 7 %
NEUTROPHILS # BLD AUTO: 5 X10E3/UL (ref 1.4–7)
NEUTROPHILS NFR BLD AUTO: 49 %
PLATELET # BLD AUTO: 253 X10E3/UL (ref 150–450)
POTASSIUM SERPL-SCNC: 4.4 MMOL/L (ref 3.5–5.2)
PROT SERPL-MCNC: 7.2 G/DL (ref 6–8.5)
RBC # BLD AUTO: 4.55 X10E6/UL (ref 3.77–5.28)
SODIUM SERPL-SCNC: 140 MMOL/L (ref 134–144)
TSH SERPL DL<=0.005 MIU/L-ACNC: 2.84 UIU/ML (ref 0.45–4.5)
VIT B12 SERPL-MCNC: 349 PG/ML (ref 232–1245)
WBC # BLD AUTO: 10.2 X10E3/UL (ref 3.4–10.8)

## 2024-12-22 LAB
BACTERIA UR CULT: NO GROWTH
BACTERIA UR CULT: NORMAL

## 2024-12-31 ENCOUNTER — OFFICE VISIT (OUTPATIENT)
Dept: NEUROLOGY | Facility: CLINIC | Age: 29
End: 2024-12-31
Payer: MEDICAID

## 2024-12-31 VITALS
HEART RATE: 89 BPM | SYSTOLIC BLOOD PRESSURE: 98 MMHG | WEIGHT: 145.6 LBS | BODY MASS INDEX: 22.85 KG/M2 | OXYGEN SATURATION: 99 % | HEIGHT: 67 IN | DIASTOLIC BLOOD PRESSURE: 66 MMHG

## 2024-12-31 DIAGNOSIS — G43.C0 PERIODIC HEADACHE SYNDROME, NOT INTRACTABLE: Primary | ICD-10-CM

## 2024-12-31 PROCEDURE — 1160F RVW MEDS BY RX/DR IN RCRD: CPT | Performed by: PSYCHIATRY & NEUROLOGY

## 2024-12-31 PROCEDURE — 1159F MED LIST DOCD IN RCRD: CPT | Performed by: PSYCHIATRY & NEUROLOGY

## 2024-12-31 PROCEDURE — 99244 OFF/OP CNSLTJ NEW/EST MOD 40: CPT | Performed by: PSYCHIATRY & NEUROLOGY

## 2024-12-31 RX ORDER — AMITRIPTYLINE HYDROCHLORIDE 10 MG/1
10-20 TABLET ORAL NIGHTLY
Qty: 60 TABLET | Refills: 5 | Status: SHIPPED | OUTPATIENT
Start: 2024-12-31

## 2024-12-31 RX ORDER — SUMATRIPTAN SUCCINATE 100 MG/1
TABLET ORAL
Qty: 12 TABLET | Refills: 3 | Status: SHIPPED | OUTPATIENT
Start: 2024-12-31

## 2024-12-31 NOTE — PROGRESS NOTES
Subjective   Patient ID: Yari Ruby is a 29 y.o. female     Chief Complaint   Patient presents with    Headache      History of Present Illness    29 y.o. female referred by Mayte Polk PA-C for headaches.    Daily HA for over 7 years.  Located at base of skull and goes to eyes.   Quality is sharp.  Intensity is 8/10.  Sensitive to light/sound/nausea.  Bending over provokes pain.     Tylenol daily.      IUD     Reviewed medical records:    Abdominal discomfort and HA for several weeks.  Stopped Zoloft beginning of December.      L ear pain for several weeks.  Waxing and waning.  Severity 6/10.      Labs CBC, CMP, TSH, B12, A1C NCS     Past Medical History:   Diagnosis Date    Anxiety 2021    Depression 2021    Headache     As long as i can remember     Family History   Family history unknown: Yes     Social History     Socioeconomic History    Marital status: Single   Tobacco Use    Smoking status: Never     Passive exposure: Never    Smokeless tobacco: Never   Vaping Use    Vaping status: Never Used   Substance and Sexual Activity    Alcohol use: Yes     Alcohol/week: 2.0 standard drinks of alcohol     Types: 2 Drinks containing 0.5 oz of alcohol per week    Drug use: Never    Sexual activity: Yes     Partners: Male     Birth control/protection: I.U.D.       Review of Systems   Constitutional:  Negative for activity change, fatigue and unexpected weight change.   HENT:  Negative for tinnitus and trouble swallowing.    Eyes:  Positive for photophobia. Negative for visual disturbance.   Respiratory:  Negative for apnea, cough and choking.    Cardiovascular:  Negative for leg swelling.   Gastrointestinal:  Positive for abdominal pain. Negative for nausea and vomiting.   Endocrine: Negative for cold intolerance and heat intolerance.   Genitourinary:  Negative for difficulty urinating, frequency, menstrual problem and urgency.   Musculoskeletal:  Negative for back pain, gait problem, myalgias and neck pain.  "  Skin:  Negative for color change and rash.   Allergic/Immunologic: Negative for immunocompromised state.   Neurological:  Positive for headaches. Negative for dizziness, tremors, seizures, syncope, facial asymmetry, speech difficulty, weakness, light-headedness and numbness.   Hematological:  Negative for adenopathy. Does not bruise/bleed easily.   Psychiatric/Behavioral:  Negative for behavioral problems, confusion, decreased concentration, hallucinations and sleep disturbance.        Objective     Vitals:    12/31/24 0826   BP: 98/66   Pulse: 89   SpO2: 99%   Weight: 66 kg (145 lb 9.6 oz)   Height: 170.2 cm (67.01\")       Neurological Exam  Mental Status  Awake, alert and oriented to person, place and time. Oriented to person, place and time. Speech is normal. Language is fluent with no aphasia. Attention and concentration are normal. Fund of knowledge is appropriate for level of education.    Cranial Nerves  CN III, IV, VI: Extraocular movements intact bilaterally. Pupils equal round and reactive to light bilaterally.  CN V: Facial sensation is normal.  CN VII: Full and symmetric facial movement.  CN IX, X: Palate elevates symmetrically  CN XI: Shoulder shrug strength is normal.  CN XII: Tongue midline without atrophy or fasciculations.    Motor   Strength is 5/5 throughout all four extremities.    Sensory  Sensation is intact to light touch, pinprick, vibration and proprioception in all four extremities.    Reflexes  Deep tendon reflexes are 2+ and symmetric in all four extremities.    Coordination    Finger-to-nose, rapid alternating movements and heel-to-shin normal bilaterally without dysmetria.    Gait  Normal casual, toe, heel and tandem gait.       Physical Exam  Eyes:      Extraocular Movements: Extraocular movements intact.      Pupils: Pupils are equal, round, and reactive to light.   Neurological:      Motor: Motor strength is normal.     Coordination: Coordination is intact.      Deep Tendon " Reflexes: Reflexes are normal and symmetric.   Psychiatric:         Speech: Speech normal.         Office Visit on 12/20/2024   Component Date Value Ref Range Status    WBC 12/20/2024 10.2  3.4 - 10.8 x10E3/uL Final    RBC 12/20/2024 4.55  3.77 - 5.28 x10E6/uL Final    Hemoglobin 12/20/2024 13.4  11.1 - 15.9 g/dL Final    Hematocrit 12/20/2024 40.5  34.0 - 46.6 % Final    MCV 12/20/2024 89  79 - 97 fL Final    MCH 12/20/2024 29.5  26.6 - 33.0 pg Final    MCHC 12/20/2024 33.1  31.5 - 35.7 g/dL Final    RDW 12/20/2024 11.9  11.7 - 15.4 % Final    Platelets 12/20/2024 253  150 - 450 x10E3/uL Final    Neutrophil Rel % 12/20/2024 49  Not Estab. % Final    Lymphocyte Rel % 12/20/2024 42  Not Estab. % Final    Monocyte Rel % 12/20/2024 7  Not Estab. % Final    Eosinophil Rel % 12/20/2024 2  Not Estab. % Final    Basophil Rel % 12/20/2024 0  Not Estab. % Final    Neutrophils Absolute 12/20/2024 5.0  1.4 - 7.0 x10E3/uL Final    Lymphocytes Absolute 12/20/2024 4.3 (H)  0.7 - 3.1 x10E3/uL Final    Monocytes Absolute 12/20/2024 0.7  0.1 - 0.9 x10E3/uL Final    Eosinophils Absolute 12/20/2024 0.2  0.0 - 0.4 x10E3/uL Final    Basophils Absolute 12/20/2024 0.0  0.0 - 0.2 x10E3/uL Final    Immature Granulocyte Rel % 12/20/2024 0  Not Estab. % Final    Immature Grans Absolute 12/20/2024 0.0  0.0 - 0.1 x10E3/uL Final    Glucose 12/20/2024 76  70 - 99 mg/dL Final    BUN 12/20/2024 11  6 - 20 mg/dL Final    Creatinine 12/20/2024 0.62  0.57 - 1.00 mg/dL Final    EGFR Result 12/20/2024 125  >59 mL/min/1.73 Final    BUN/Creatinine Ratio 12/20/2024 18  9 - 23 Final    Sodium 12/20/2024 140  134 - 144 mmol/L Final    Potassium 12/20/2024 4.4  3.5 - 5.2 mmol/L Final    Chloride 12/20/2024 102  96 - 106 mmol/L Final    Total CO2 12/20/2024 23  20 - 29 mmol/L Final    Calcium 12/20/2024 9.5  8.7 - 10.2 mg/dL Final    Total Protein 12/20/2024 7.2  6.0 - 8.5 g/dL Final    Albumin 12/20/2024 4.6  4.0 - 5.0 g/dL Final    Globulin 12/20/2024 2.6   1.5 - 4.5 g/dL Final    Total Bilirubin 12/20/2024 0.3  0.0 - 1.2 mg/dL Final    Alkaline Phosphatase 12/20/2024 79  44 - 121 IU/L Final    AST (SGOT) 12/20/2024 17  0 - 40 IU/L Final    ALT (SGPT) 12/20/2024 10  0 - 32 IU/L Final    TSH 12/20/2024 2.840  0.450 - 4.500 uIU/mL Final    Vitamin B-12 12/20/2024 349  232 - 1,245 pg/mL Final    Hemoglobin A1C 12/20/2024 5.0  4.8 - 5.6 % Final    Comment:          Prediabetes: 5.7 - 6.4           Diabetes: >6.4           Glycemic control for adults with diabetes: <7.0      Amylase 12/20/2024 59  31 - 110 U/L Final    Lipase 12/20/2024 35  14 - 72 U/L Final    Urine Culture 12/20/2024 Final report   Final    Result 1 12/20/2024 No growth   Final    Color 12/20/2024 Yellow  Yellow, Straw, Dark Yellow, Grace Final    Clarity, UA 12/20/2024 Clear  Clear Final    Glucose, UA 12/20/2024 Negative  Negative mg/dL Final    Bilirubin 12/20/2024 Negative  Negative Final    Ketones, UA 12/20/2024 Negative  Negative Final    Specific Gravity  12/20/2024 1.025  1.005 - 1.030 Final    Blood, UA 12/20/2024 Negative  Negative Final    pH, Urine 12/20/2024 6.5  5.0 - 8.0 Final    Protein, POC 12/20/2024 Negative  Negative mg/dL Final    Urobilinogen, UA 12/20/2024 Normal  Normal, 0.2 E.U./dL Final    Leukocytes 12/20/2024 Small (1+) (A)  Negative Final    Nitrite, UA 12/20/2024 Negative  Negative Final         Assessment & Plan     Problem List Items Addressed This Visit          Neuro    Periodic headache syndrome, not intractable - Primary    Current Assessment & Plan     Start Elavil and Imitrex     MRI Brain                  Relevant Medications    amitriptyline (ELAVIL) 10 MG tablet    SUMAtriptan (IMITREX) 100 MG tablet    Other Relevant Orders    MRI Brain With & Without Contrast          No follow-ups on file.

## 2025-01-22 ENCOUNTER — TELEPHONE (OUTPATIENT)
Dept: FAMILY MEDICINE CLINIC | Facility: CLINIC | Age: 30
End: 2025-01-22
Payer: MEDICAID